# Patient Record
Sex: MALE | Race: WHITE | NOT HISPANIC OR LATINO
[De-identification: names, ages, dates, MRNs, and addresses within clinical notes are randomized per-mention and may not be internally consistent; named-entity substitution may affect disease eponyms.]

---

## 2022-10-31 ENCOUNTER — FORM ENCOUNTER (OUTPATIENT)
Age: 22
End: 2022-10-31

## 2022-10-31 ENCOUNTER — APPOINTMENT (OUTPATIENT)
Dept: ORTHOPEDIC SURGERY | Facility: CLINIC | Age: 22
End: 2022-10-31

## 2022-10-31 VITALS — WEIGHT: 225 LBS | HEIGHT: 74 IN | BODY MASS INDEX: 28.88 KG/M2

## 2022-10-31 DIAGNOSIS — M25.512 PAIN IN LEFT SHOULDER: ICD-10-CM

## 2022-10-31 DIAGNOSIS — Z00.00 ENCOUNTER FOR GENERAL ADULT MEDICAL EXAMINATION W/OUT ABNORMAL FINDINGS: ICD-10-CM

## 2022-10-31 DIAGNOSIS — Z78.9 OTHER SPECIFIED HEALTH STATUS: ICD-10-CM

## 2022-10-31 DIAGNOSIS — M54.2 CERVICALGIA: ICD-10-CM

## 2022-10-31 DIAGNOSIS — S40.012A CONTUSION OF LEFT SHOULDER, INITIAL ENCOUNTER: ICD-10-CM

## 2022-10-31 DIAGNOSIS — S13.9XXA SPRAIN OF JOINTS AND LIGAMENTS OF UNSPECIFIED PARTS OF NECK, INITIAL ENCOUNTER: ICD-10-CM

## 2022-10-31 DIAGNOSIS — M75.82 OTHER SHOULDER LESIONS, LEFT SHOULDER: ICD-10-CM

## 2022-10-31 PROCEDURE — 99072 ADDL SUPL MATRL&STAF TM PHE: CPT

## 2022-10-31 PROCEDURE — 72040 X-RAY EXAM NECK SPINE 2-3 VW: CPT

## 2022-10-31 PROCEDURE — 73030 X-RAY EXAM OF SHOULDER: CPT | Mod: LT

## 2022-10-31 PROCEDURE — 99205 OFFICE O/P NEW HI 60 MIN: CPT

## 2022-10-31 NOTE — IMAGING
[de-identified] : LT Shoulder Exam:  IR is thumb to lower thoracic.  There is full active abduction.  He has full GH abduction.  ER at 90 abduction is 10 degrees past vertical without any signs of instability.  Both ER and IR strength are excellent.  Empty can strength is excellent with slight referred pain.\par \par Cervical Spine Exam: Tenderness over the LT superior trapezius with mild swelling here.  There is no contralateral tenderness.  There is no rhomboid tenderness.  There is tenderness over the LT occipital muscle.  ROM in all planes is full and produces no radicular pain.  DTR of the biceps and triceps are 2+ and symmetrical.  On inspected there is no gross atrophy of any of the upper extremity muscles.\par \par X-ray of the LT shoulder (2 views) on 10/31/2022:  There is aq type 2 acromion.  There is no sign of DJD.  No STC.  No X-Ray evidence of fractures or lesions.\par \par X-ray of the cervical spine/ neck (2 views) on 10/31/2022:  There is a normal lordosis.  There is no significant facet arthrosis.  No evidence of DDD.  No X-Ray evidence of fractures or lesions.\par \par \par

## 2022-10-31 NOTE — DISCUSSION/SUMMARY
[de-identified] : 1) I discussed that the patient's condition is expected to recover over time with conservative treatment and maintaining physical activity, or no treatment at all.\par 2) **Rx PT for the LT shoulder and neck/ cervical spine\par 3) The patient may continue to attend chiropractic therapy\par 4) I highly recommend the patient remain active and use the LUE and shoulder to avoid loss of ROM or strength.\par 5) Pt will continue with activity modification and home exercise as tolerated.  The patient should avoid exercise and activity that aggravates pain. \par 6) The patient may take OTC NSAIDs PRN for pain. \par 7)**MRI of the LT shoulder will be performed to evaluate rotator cuff and labrum\par 8) The patient may also receive massage therapy for cervical sprian.\par \par The patient will continue with conservative treatment as described above, and will F/U after MRI\par  \par NSAIDs- The patient was informed of the risks of this medication to GI tract, increased blood pressure, cardiac risk, and risk of fluid retention. Advised to clear medication with internist or PCP if any concurrent health problem with heart, blood pressure, or GI system exists.  The risks, benefits and alternatives of NSAIDs were discussed.  The patient was instructed on the proper usage of  NSAIDs. \par \par The patient was advised of the diagnosis.  The natural history of the pathology was explained in full to the patient in layman's terms, including but not limited to the risks, symptoms and available options for treatment.  We discussed the risks, benefits and alternatives of the treatment options and the advice I provided to the patient as listed above.  Pt was given the opportunity to ask questions, and all questions were answered.  The discussion was not limited to the above.\par \par Entered by Fidel Flores acting as scribe.\par
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
## 2022-11-01 ENCOUNTER — APPOINTMENT (OUTPATIENT)
Dept: MRI IMAGING | Facility: CLINIC | Age: 22
End: 2022-11-01

## 2022-11-01 PROCEDURE — 73221 MRI JOINT UPR EXTREM W/O DYE: CPT | Mod: LT

## 2022-11-01 PROCEDURE — 99072 ADDL SUPL MATRL&STAF TM PHE: CPT

## 2022-12-02 ENCOUNTER — APPOINTMENT (OUTPATIENT)
Dept: ORTHOPEDIC SURGERY | Facility: CLINIC | Age: 22
End: 2022-12-02